# Patient Record
Sex: FEMALE | Race: BLACK OR AFRICAN AMERICAN | Employment: FULL TIME | URBAN - METROPOLITAN AREA
[De-identification: names, ages, dates, MRNs, and addresses within clinical notes are randomized per-mention and may not be internally consistent; named-entity substitution may affect disease eponyms.]

---

## 2023-02-13 ENCOUNTER — HOSPITAL ENCOUNTER (EMERGENCY)
Age: 18
Discharge: HOME | End: 2023-02-13
Payer: COMMERCIAL

## 2023-02-13 VITALS
WEIGHT: 151 LBS | DIASTOLIC BLOOD PRESSURE: 62 MMHG | TEMPERATURE: 99.9 F | HEART RATE: 101 BPM | HEIGHT: 64 IN | BODY MASS INDEX: 25.78 KG/M2 | SYSTOLIC BLOOD PRESSURE: 98 MMHG | OXYGEN SATURATION: 97 % | RESPIRATION RATE: 18 BRPM

## 2023-02-13 DIAGNOSIS — J03.90 ACUTE TONSILLITIS, UNSPECIFIED ETIOLOGY: Primary | ICD-10-CM

## 2023-02-13 LAB — DEPRECATED S PYO AG THROAT QL EIA: NEGATIVE

## 2023-02-13 PROCEDURE — 87070 CULTURE OTHR SPECIMN AEROBIC: CPT

## 2023-02-13 PROCEDURE — 99283 EMERGENCY DEPT VISIT LOW MDM: CPT

## 2023-02-13 PROCEDURE — 87147 CULTURE TYPE IMMUNOLOGIC: CPT

## 2023-02-13 PROCEDURE — 87880 STREP A ASSAY W/OPTIC: CPT

## 2023-02-13 PROCEDURE — 74011250637 HC RX REV CODE- 250/637: Performed by: PHYSICIAN ASSISTANT

## 2023-02-13 RX ORDER — DEXAMETHASONE SODIUM PHOSPHATE 10 MG/ML
10 INJECTION INTRAMUSCULAR; INTRAVENOUS
Status: COMPLETED | OUTPATIENT
Start: 2023-02-13 | End: 2023-02-13

## 2023-02-13 RX ORDER — PENICILLIN V POTASSIUM 500 MG/1
500 TABLET, FILM COATED ORAL 2 TIMES DAILY
Qty: 20 TABLET | Refills: 0 | Status: SHIPPED | OUTPATIENT
Start: 2023-02-13 | End: 2023-02-23

## 2023-02-13 RX ORDER — ACETAMINOPHEN 325 MG/1
650 TABLET ORAL ONCE
Status: COMPLETED | OUTPATIENT
Start: 2023-02-13 | End: 2023-02-13

## 2023-02-13 RX ADMIN — DEXAMETHASONE SODIUM PHOSPHATE 10 MG: 10 INJECTION INTRAMUSCULAR; INTRAVENOUS at 21:30

## 2023-02-13 RX ADMIN — ACETAMINOPHEN 650 MG: 325 TABLET ORAL at 21:30

## 2023-02-13 NOTE — Clinical Note
[facility] EMERGENCY DEPT  [facility] [place] [id number] [phone]    Work/School Note    Date: 2/13/2023    To Whom It May concern:    Brittany Machuca was seen and treated today in the emergency room by the following provider(s):  Physician Assistant: Ann Singh PA-C. Brittany Machuca is excused from work/school on 02/13/23 and 02/14/23. She is medically clear to return to work/school on 2/15/2023.        Sincerely,          Miki Moreno PA-C

## 2023-02-13 NOTE — Clinical Note
[address] [facility] EMERGENCY DEPT  [address] [place] [id number]  [phone]    Work/School Note    Date: 2/13/2023    To Whom It May concern:    Kathie Garland was seen and treated today in the emergency room by the following provider(s):  Physician Assistant: Cynthia Salter PA-C. Kathie Garland is excused from work/school on 2/13/2023 through 2/15/2023. She is medically clear to return to work/school on 2/16/2023.          Sincerely,          Saritha Moreno PA-C

## 2023-02-14 NOTE — ED TRIAGE NOTES
Pt arrived to ED from home via POV with CC of swollen tonsils. No associated symptoms. Pt A&O x4. NAD in triage.

## 2023-02-14 NOTE — ED PROVIDER NOTES
[facility] EMERGENCY DEPT  EMERGENCY DEPARTMENT HISTORY AND PHYSICAL EXAM      Date: 2/13/2023  Patient Name: Moe Mata  MRN: [id number]  [place]: 2005  Date of evaluation: 2/13/2023  Provider: Haley Moreno PA-C   Note Started: 8:03 PM 2/13/23    HISTORY OF PRESENT ILLNESS     Chief Complaint   Patient presents with    Sore Throat       History Provided By: Patient    HPI: Moe Mata, 21 y.o. female with no significant past medical history presents ED with cc of sore throat. Patient reports 2 to 3-day history of sore throat and a sensation that her tonsils are swollen. Denies any associated fevers, chills, difficulty swallowing, drooling, voice change, ear pain, cough, congestion, nausea, vomiting. Denies treating symptoms anything. Notes no alleviating or exacerbating factors. States that she is otherwise well has no further concerns. PAST MEDICAL HISTORY   Past Medical History:  No past medical history on file. Past Surgical History:  No past surgical history on file. Family History:  No family history on file. Social History: Allergies:  No Known Allergies    PCP: None    Current Meds:   Discharge Medication List as of 2/13/2023 10:22 PM          REVIEW OF SYSTEMS   Review of Systems   Constitutional: Negative. Negative for chills, diaphoresis and fever. HENT:  Positive for sore throat. Negative for congestion, drooling, ear pain, facial swelling, rhinorrhea, trouble swallowing and voice change. Eyes: Negative. Respiratory: Negative. Negative for cough, chest tightness, shortness of breath and wheezing. Cardiovascular: Negative. Negative for chest pain and palpitations. Gastrointestinal: Negative. Negative for abdominal pain, diarrhea, nausea and vomiting. Genitourinary: Negative. Negative for difficulty urinating, dysuria, flank pain, frequency and hematuria. Musculoskeletal: Negative. Skin: Negative. Negative for rash. Neurological: Negative.   Negative for dizziness, syncope, weakness, light-headedness, numbness and headaches. Psychiatric/Behavioral: Negative. All other systems reviewed and are negative. Positives and Pertinent negatives as per HPI. PHYSICAL EXAM     ED Triage Vitals [02/13/23 1954]   ED Encounter Vitals Group      /65      Pulse (Heart Rate) (!) 103      Resp Rate 18      Temp 99.9 °F (37.7 °C)      Temp src       O2 Sat (%) 97 %      Weight 151 lb      Height 5' 4\"      Physical Exam  Vitals and nursing note reviewed. Constitutional:       General: She is not in acute distress. Appearance: Normal appearance. She is not ill-appearing or toxic-appearing. HENT:      Head: Normocephalic and atraumatic. Jaw: There is normal jaw occlusion. Right Ear: Tympanic membrane normal.      Left Ear: Tympanic membrane normal.      Mouth/Throat:      Mouth: Mucous membranes are moist. No angioedema. Tongue: No lesions. Palate: No lesions. Pharynx: Oropharynx is clear. Uvula midline. No pharyngeal swelling, oropharyngeal exudate, posterior oropharyngeal erythema or uvula swelling. Tonsils: No tonsillar exudate or tonsillar abscesses. 3+ on the right. 3+ on the left. Comments: Tonsillar hypertrophy  Eyes:      Extraocular Movements: Extraocular movements intact. Conjunctiva/sclera: Conjunctivae normal.      Pupils: Pupils are equal, round, and reactive to light. Cardiovascular:      Rate and Rhythm: Regular rhythm. Tachycardia present. Heart sounds: Normal heart sounds. No murmur heard. No friction rub. No gallop. Pulmonary:      Effort: Pulmonary effort is normal.      Breath sounds: Normal breath sounds. No wheezing, rhonchi or rales. Abdominal:      General: There is no distension. Palpations: Abdomen is soft. Tenderness: There is no abdominal tenderness. There is no guarding or rebound. Musculoskeletal:      Cervical back: Neck supple. No rigidity or tenderness.  Normal range of motion. Lymphadenopathy:      Cervical: No cervical adenopathy. Skin:     General: Skin is warm and dry. Capillary Refill: Capillary refill takes less than 2 seconds. Findings: No rash. Neurological:      General: No focal deficit present. Mental Status: She is alert and oriented to person, place, and time. Psychiatric:         Mood and Affect: Mood normal.         Behavior: Behavior normal.       SCREENINGS               No data recorded      LAB, EKG AND DIAGNOSTIC RESULTS   Labs:  Recent Results (from the past 12 hour(s))   STREP AG SCREEN, GROUP A    Collection Time: 02/13/23  7:58 PM    Specimen: Serum; Throat   Result Value Ref Range    Group A Strep Ag ID Negative Negative           Radiologic Studies:  Non-plain film images such as CT, Ultrasound and MRI are read by the radiologist. Plain radiographic images are visualized and preliminarily interpreted by the ED Provider with the below findings:      Interpretation per the Radiologist below, if available at the time of this note:  No results found. PROCEDURES   Unless otherwise noted below, none. Performed by: Paige So PA-C   Procedures        ED COURSE and DIFFERENTIAL DIAGNOSIS/MDM   Vitals:    Vitals:    02/13/23 1954 02/13/23 2222   BP: 117/65 98/62   Pulse: (!) 103 (!) 101   Resp: 18    Temp: 99.9 °F (37.7 °C)    SpO2: 97% 97%   Weight: 68.5 kg (151 lb)    Height: 5' 4\" (1.626 m)         Patient was given the following medications:  Medications   dexamethasone (PF) (DECADRON) 10 mg/mL Oral 10 mg (10 mg Oral Given 2/13/23 2130)   acetaminophen (TYLENOL) tablet 650 mg (650 mg Oral Given 2/13/23 2130)           Records Reviewed (source and summary of external notes): Prior medical records and Nursing notes    CC/HPI Summary, DDx, ED Course, and Reassessment:   Pt presents with acute URI symptoms including nasal congestion, rhinorrhea and sore throat.  Pt also has c/o of cough without dyspnea, chest pain or wheezing. Pt is well-appearing with stable vitals and benign exam; symptoms are consistent with an uncomplicated URI. DDx: COVID-19, acute bronchitis, bacterial sinusitis vs. pharyngitis, migraine, flu. 10:20 PM  Patient reassessed and updated on all results and findings. States that her pain is well controlled this time. Patient tolerating p.o. without difficulty. She has been encouraged to follow-up with ENT specialist if her condition persist, or return to ED sooner if worse. Patient educated on strict return precautions and conveys good understanding and agreement with care plan as outlined. She has no new complaints or concerns and all of her questions have been answered. Anticipate discharge home shortly. Patient no longer tachycardic at time of discharge. FINAL IMPRESSION     1. Acute tonsillitis, unspecified etiology          DISPOSITION/PLAN   Discharged    Discharge Note: The patient is stable for discharge home. The signs, symptoms, diagnosis, and discharge instructions have been discussed, understanding conveyed, and agreed upon. The patient is to follow up as recommended or return to ER should their symptoms worsen. PATIENT REFERRED TO:  Follow-up Information       Follow up With Specialties Details Why Contact Info    [facility]  Schedule an appointment as soon as possible for a visit   [address] [place] [address].  Martine Taveras, [address] [facility] [address]   As needed [address] [address]  [phone]    [facility] EMERGENCY DEPT Emergency Medicine  If symptoms worsen [address]  [phone]              DISCHARGE MEDICATIONS:  Discharge Medication List as of 2/13/2023 10:22 PM        START taking these medications    Details   penicillin v potassium (VEETID) 500 mg tablet Take 1 Tablet by mouth two (2) times a day for 10 days. , Normal, Disp-20 Tablet, R-0               DISCONTINUED MEDICATIONS:  Discharge Medication List as of 2/13/2023 10:22 PM          I am the Primary Clinician of Record: Kevin Cruz PA-C (electronically signed)    (Please note that parts of this dictation were completed with voice recognition software. Quite often unanticipated grammatical, syntax, homophones, and other interpretive errors are inadvertently transcribed by the computer software. Please disregards these errors.  Please excuse any errors that have escaped final proofreading.)

## 2023-02-15 LAB
BACTERIA SPEC CULT: NORMAL
SPECIAL REQUESTS,SREQ: NORMAL

## 2023-02-15 NOTE — PROGRESS NOTES
Results called for positive strep throat culture. Per chart review, patient started on penicillin VK which will cover strep. No further action needed at this time.

## 2023-02-21 ENCOUNTER — OFFICE VISIT (OUTPATIENT)
Dept: ENT CLINIC | Age: 18
End: 2023-02-21
Payer: COMMERCIAL

## 2023-02-21 VITALS
OXYGEN SATURATION: 99 % | SYSTOLIC BLOOD PRESSURE: 110 MMHG | DIASTOLIC BLOOD PRESSURE: 78 MMHG | HEIGHT: 64 IN | HEART RATE: 105 BPM | RESPIRATION RATE: 19 BRPM | BODY MASS INDEX: 25.78 KG/M2 | WEIGHT: 151 LBS

## 2023-02-21 DIAGNOSIS — J03.00 STREPTOCOCCAL TONSILLITIS: Primary | ICD-10-CM

## 2023-02-21 PROCEDURE — 99203 OFFICE O/P NEW LOW 30 MIN: CPT | Performed by: NURSE PRACTITIONER

## 2023-02-21 RX ORDER — METHYLPREDNISOLONE 4 MG/1
4 TABLET ORAL
Qty: 1 DOSE PACK | Refills: 0 | Status: SHIPPED | OUTPATIENT
Start: 2023-02-21

## 2023-02-21 RX ORDER — PENICILLIN V POTASSIUM 500 MG/1
500 TABLET, FILM COATED ORAL 2 TIMES DAILY
Qty: 10 TABLET | Refills: 0 | Status: SHIPPED | OUTPATIENT
Start: 2023-02-21 | End: 2023-02-26

## 2023-02-21 NOTE — PROGRESS NOTES
Subjective:    Ivan Tee   21 y.o.   2005     New Patient Visit  This is a 21 y.o. female who is here today after being seen in the ED on 2/13/2023 for tonsillitis. Hier culture in the ED showed heavy group A Streptococcus. She was treated with Penicillin V potassium, dexamethasone. He is here today for follow up. She is still having pain with swallowing, she also has a feeling of fullness on the left side. She denies fevers, weight loss. She does not smoke, use illicit substances, or use alcohol. She did not have frequent infection when she was younger, she states she had strep once in 6th grade. Her pain is about a 6 on a 1-10 scale when swallowing, and about a 3 when just sitting. Review of Systems  Review of Systems   Constitutional: Negative. HENT:  Positive for sore throat. Eyes: Negative. Respiratory: Negative. Cardiovascular: Negative. Gastrointestinal: Negative. Genitourinary: Negative. Musculoskeletal: Negative. Skin: Negative. Neurological: Negative. Endo/Heme/Allergies: Negative. Psychiatric/Behavioral: Negative. No past medical history on file. No past surgical history on file. No family history on file. Social History     Tobacco Use    Smoking status: Not on file    Smokeless tobacco: Not on file   Substance Use Topics    Alcohol use: Not on file      Prior to Admission medications    Medication Sig Start Date End Date Taking? Authorizing Provider   penicillin v potassium (VEETID) 500 mg tablet Take 1 Tablet by mouth two (2) times a day for 10 days. 2/13/23 2/23/23  Blane Moreno PA-C        No Known Allergies      Objective: There were no vitals taken for this visit. Physical Exam  Constitutional:       General: She is not in acute distress. Appearance: Normal appearance. She is obese. She is not ill-appearing, toxic-appearing or diaphoretic. HENT:      Head: Normocephalic.       Right Ear: Tympanic membrane, ear canal and external ear normal. There is no impacted cerumen. Left Ear: Tympanic membrane, ear canal and external ear normal. There is no impacted cerumen. Nose: Nose normal. No congestion or rhinorrhea. Mouth/Throat:      Mouth: Mucous membranes are moist.      Pharynx: Oropharyngeal exudate and posterior oropharyngeal erythema present. Eyes:      General:         Right eye: No discharge. Left eye: No discharge. Extraocular Movements: Extraocular movements intact. Pupils: Pupils are equal, round, and reactive to light. Cardiovascular:      Rate and Rhythm: Normal rate and regular rhythm. Pulmonary:      Effort: Pulmonary effort is normal.      Breath sounds: Normal breath sounds. Abdominal:      General: Bowel sounds are normal.   Musculoskeletal:         General: Normal range of motion. Cervical back: Normal range of motion and neck supple. No rigidity or tenderness. Lymphadenopathy:      Cervical: No cervical adenopathy. Skin:     General: Skin is warm. Capillary Refill: Capillary refill takes less than 2 seconds. Neurological:      General: No focal deficit present. Mental Status: She is alert. Psychiatric:         Mood and Affect: Mood normal.         Behavior: Behavior normal.         Thought Content: Thought content normal.         Judgment: Judgment normal.       Assessment/Plan:     Encounter Diagnoses   Name Primary?     Streptococcal tonsillitis Yes     Orders Placed This Encounter    methylPREDNISolone (MEDROL DOSEPACK) 4 mg tablet    penicillin v potassium (VEETID) 500 mg tablet   Group A strep tonsillitis   Continue penicillin, extend for an additional 5 days    Add medrol dose pack   Follow-up if needed    No abscess noted           Thank you for referring this patient,    Ray German AGACNP-BC     [address]  ENT  [phone]

## 2023-03-07 ENCOUNTER — TELEPHONE (OUTPATIENT)
Dept: ENT CLINIC | Age: 18
End: 2023-03-07

## 2023-03-07 NOTE — TELEPHONE ENCOUNTER
Pt called request an appt for a peritonsillar abscess.  I scheduled her to see Edilson Amaro 3/8 at 11am but please let me know if this needs to be seen by MD instead and if so, please advise on an appt

## 2023-03-08 ENCOUNTER — OFFICE VISIT (OUTPATIENT)
Dept: ENT CLINIC | Age: 18
End: 2023-03-08
Payer: COMMERCIAL

## 2023-03-08 VITALS
SYSTOLIC BLOOD PRESSURE: 110 MMHG | WEIGHT: 151 LBS | HEIGHT: 64 IN | DIASTOLIC BLOOD PRESSURE: 70 MMHG | BODY MASS INDEX: 25.78 KG/M2

## 2023-03-08 DIAGNOSIS — J03.00 STREPTOCOCCAL TONSILLITIS: Primary | ICD-10-CM

## 2023-03-08 PROCEDURE — 99212 OFFICE O/P EST SF 10 MIN: CPT | Performed by: NURSE PRACTITIONER

## 2023-03-08 RX ORDER — AMOXICILLIN AND CLAVULANATE POTASSIUM 875; 125 MG/1; MG/1
TABLET, FILM COATED ORAL
COMMUNITY
Start: 2023-03-07

## 2023-03-08 NOTE — PROGRESS NOTES
Subjective:    Diandra Schaeffer   21 y.o.   2005     Patient Visit  This is a 21 y.o. female who is here today to discuss sore throat. [facility] and was diagnosed with St[facility] A. She states she was told this was likely a reinfection from not changing her toothbrush after her last strep infection in February 2023, they PA who saw her advised her to follow-up with ENT for possible peritonsillar abscess. She endorses throat pain, worse with swallowing. It was initially getting better, but then Monday she started feeling bad again. Review of Systems  Review of Systems   Constitutional: Negative. HENT:  Positive for sore throat. Eyes: Negative. Respiratory: Negative. Cardiovascular: Negative. Gastrointestinal: Negative. Genitourinary: Negative. Musculoskeletal: Negative. Skin: Negative. Neurological: Negative. Endo/Heme/Allergies: Negative. Psychiatric/Behavioral: Negative. No past medical history on file. No past surgical history on file. No family history on file. Social History     Tobacco Use    Smoking status: Never    Smokeless tobacco: Never   Substance Use Topics    Alcohol use: Never      Prior to Admission medications    Medication Sig Start Date End Date Taking? Authorizing Provider   methylPREDNISolone (MEDROL DOSEPACK) 4 mg tablet Take 1 Tablet by mouth Specific Days and Specific Times. 2/21/23   Rebekah Marie NP        No Known Allergies      Objective: There were no vitals taken for this visit. Physical Exam  Constitutional:       General: She is not in acute distress. Appearance: Normal appearance. She is normal weight. She is not ill-appearing, toxic-appearing or diaphoretic. HENT:      Head: Normocephalic and atraumatic. Right Ear: Tympanic membrane, ear canal and external ear normal. There is no impacted cerumen. Left Ear: Tympanic membrane, ear canal and external ear normal. There is no impacted cerumen. Nose: Nose normal. No congestion or rhinorrhea. Mouth/Throat:      Mouth: Mucous membranes are moist.      Pharynx: Oropharyngeal exudate and posterior oropharyngeal erythema present. Eyes:      General: No scleral icterus. Right eye: No discharge. Left eye: No discharge. Extraocular Movements: Extraocular movements intact. Conjunctiva/sclera: Conjunctivae normal.      Pupils: Pupils are equal, round, and reactive to light. Cardiovascular:      Rate and Rhythm: Normal rate and regular rhythm. Pulmonary:      Effort: Pulmonary effort is normal.      Breath sounds: Normal breath sounds. Abdominal:      General: Abdomen is flat. Musculoskeletal:         General: Normal range of motion. Cervical back: Normal range of motion and neck supple. Skin:     General: Skin is warm and dry. Capillary Refill: Capillary refill takes less than 2 seconds. Neurological:      General: No focal deficit present. Mental Status: She is alert and oriented to person, place, and time. Mental status is at baseline. Psychiatric:         Mood and Affect: Mood normal.         Behavior: Behavior normal.         Thought Content: Thought content normal.         Judgment: Judgment normal.       Assessment/Plan:     Encounter Diagnoses   Name Primary? Streptococcal tonsillitis Yes   -Received [facility] at [facility], she is also on a course of Augmentin for ten days  -No abscess noted on today's exam  -Recommend follow-up in about 3-4 weeks to assess resolution  No orders of the defined types were placed in this encounter.           Thank you for referring this patient,    Shane Bender AGACNP-BC     [address]  ENT  [phone]